# Patient Record
Sex: FEMALE | Race: WHITE | Employment: OTHER | URBAN - METROPOLITAN AREA
[De-identification: names, ages, dates, MRNs, and addresses within clinical notes are randomized per-mention and may not be internally consistent; named-entity substitution may affect disease eponyms.]

---

## 2019-06-07 DIAGNOSIS — M25.561 RIGHT KNEE PAIN, UNSPECIFIED CHRONICITY: Primary | ICD-10-CM

## 2019-06-10 ENCOUNTER — OFFICE VISIT (OUTPATIENT)
Dept: ORTHOPEDIC SURGERY | Age: 64
End: 2019-06-10
Payer: COMMERCIAL

## 2019-06-10 DIAGNOSIS — M25.561 RIGHT KNEE PAIN, UNSPECIFIED CHRONICITY: Primary | ICD-10-CM

## 2019-06-10 PROCEDURE — 20610 DRAIN/INJ JOINT/BURSA W/O US: CPT | Performed by: ORTHOPAEDIC SURGERY

## 2019-06-10 PROCEDURE — 99203 OFFICE O/P NEW LOW 30 MIN: CPT | Performed by: ORTHOPAEDIC SURGERY

## 2019-06-10 RX ORDER — LIDOCAINE HYDROCHLORIDE 10 MG/ML
4 INJECTION, SOLUTION EPIDURAL; INFILTRATION; INTRACAUDAL; PERINEURAL ONCE
Status: COMPLETED | OUTPATIENT
Start: 2019-06-10 | End: 2019-06-10

## 2019-06-10 RX ORDER — TRIAMCINOLONE ACETONIDE 40 MG/ML
40 INJECTION, SUSPENSION INTRA-ARTICULAR; INTRAMUSCULAR ONCE
Status: COMPLETED | OUTPATIENT
Start: 2019-06-10 | End: 2019-06-10

## 2019-06-10 RX ADMIN — LIDOCAINE HYDROCHLORIDE 4 ML: 10 INJECTION, SOLUTION EPIDURAL; INFILTRATION; INTRACAUDAL; PERINEURAL at 14:02

## 2019-06-10 RX ADMIN — TRIAMCINOLONE ACETONIDE 40 MG: 40 INJECTION, SUSPENSION INTRA-ARTICULAR; INTRAMUSCULAR at 14:03

## 2019-06-10 NOTE — LETTER
6/10/2019    No referring provider defined for this encounter. RE: Janice Cortez    Dear Dr. Stephan Luna ref. provider found,    Thank you for allowing me to participate in the care of Ms. Damico. I had the opportunity to evaluate the patient on 6/10/2019. Attached you will find my evaluation and recommendations. Thanks again for the confidence you have expressed in me by allowing my participation in the care of your patient. I will keep you apprised of further developments in the patients treatment course as it progresses. If I can be of further assistance in any fashion, please feel free to contact me at your convenience.     Sincerely,        Yoselin Walter  Shoulder and Elbow Surgery

## 2019-06-15 NOTE — PROGRESS NOTES
Orthopedic Knee Encounter Note     Chief complaint: Right knee pain    HPI: Jeffery Kirk is a 66 y.o. female who presents for chronic right knee pain. 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 She hurt her right knee at the time but did not have any surgery. She left the service and became a  and has since had 3 surgeries on this knee as a result of pain and instability. The first surgery was in  when she had an ACL reconstruction. She then had 2 surgeries on her knee to address medial and lateral meniscal tears. She has since been dealing with chronic pain in the anterior and posterior aspects of the knee. She has a hard time straightening out the knee. She reports popping locking and catching in his knee associated with intermittent swelling and indicates that her knee feels unstable. Previous treatment:    NSAIDs: Ibuprofen    Injections: Cortisone injections. Last injection in 2018    Physical therapy: Yes, 3 months ago. No improvement    Surgeries: See above    Review of Systems:     Constitution: no fever or chills   Pain level: 5/10  Musculoskeletal: As noted in the HPI   Neurologic: no neurologic symptoms    Past Medical History  Mila Grimaldo  has a past medical history of Cervical disc disease and TBI (traumatic brain injury) ([facility] [address] Past Surgical History  Mila Grimaldo  has a past surgical history that includes  section; Hysterectomy; Cosmetic surgery; Gastric bypass surgery; and knee surgery.     Current Medications  Current Outpatient Medications   Medication Sig Dispense Refill    ALPRAZolam (XANAX) 0.25 MG tablet   5    NUEDEXTA 20-10 MG CAPS per capsule   1    donepezil (ARICEPT) 10 MG tablet   0    oxyCODONE-acetaminophen (PERCOCET) 5-325 MG per tablet   0    Almotriptan Malate (AXERT PO) Take by mouth      gabapentin (NEURONTIN) 300 MG capsule Take 300 mg by mouth 3 times daily      loss associated with mild osteophytic change in both compartments. Evidence of previous ACL reconstruction with both femoral and tibial tunnels. Femoral tunnel appears slightly vertical.  Calcification involving the distal insertion of the quadriceps noted. Some calcified masses in the posterior aspect of the knee suggestive of possible loose bodies. Impression: With mild osteoarthritis and evidence of previous ACL reconstruction with possible loose bodies in the posterior aspect of the knee. Impression/Plan:     Minnie Christopher is a 66 y.o. old female with right knee pain likely due to underlying osteoarthritis. There is a question as to possible recurrent ACL tear. She very well may have associated medial and lateral meniscus tears and the aforementioned loose bodies noted on her x-ray. She has had an MRI study but unfortunately does not have the images available for me to review today. We discussed treatment options at this time. She would like to proceed with conservative measures. Consequently she had an injection administered as outlined below. I will see her back for reevaluation in 3 months. In the meantime she is encouraged to work on getting her MRI study for review at that time. Procedure: right intraarticular knee injection  Following an appropriate discussion with the patient regarding the risks and benefits of the procedure she consented to proceed. her right knee was prepped using chlorhexadine solution. Using aseptic technique and through a superolateral approach, her right knee joint was injected with a 5 cc mixture of 1cc 40mg/ml kenalog and 4 cc of 1% lidocaine without epinephrine. A band aid was applied to the injection site. she tolerated the injection with no immediate adverse reactions.         NA = Not assessed  n = No  y = Yes  SLR = Straight leg raise  MCL = Medial collateral ligament  LCL = Lateral collateral ligament

## 2019-09-16 ENCOUNTER — OFFICE VISIT (OUTPATIENT)
Dept: ORTHOPEDIC SURGERY | Age: 64
End: 2019-09-16
Payer: COMMERCIAL

## 2019-09-16 VITALS — WEIGHT: 215 LBS | HEIGHT: 65 IN | BODY MASS INDEX: 35.82 KG/M2

## 2019-09-16 DIAGNOSIS — M25.561 CHRONIC PAIN OF RIGHT KNEE: Primary | ICD-10-CM

## 2019-09-16 DIAGNOSIS — G89.29 CHRONIC PAIN OF RIGHT KNEE: Primary | ICD-10-CM

## 2019-09-16 PROCEDURE — 99213 OFFICE O/P EST LOW 20 MIN: CPT | Performed by: ORTHOPAEDIC SURGERY

## 2019-09-16 PROCEDURE — 20610 DRAIN/INJ JOINT/BURSA W/O US: CPT | Performed by: ORTHOPAEDIC SURGERY

## 2019-10-08 RX ORDER — TRIAMCINOLONE ACETONIDE 40 MG/ML
40 INJECTION, SUSPENSION INTRA-ARTICULAR; INTRAMUSCULAR ONCE
Status: COMPLETED | OUTPATIENT
Start: 2019-10-08 | End: 2019-10-08

## 2019-10-08 RX ORDER — LIDOCAINE HYDROCHLORIDE 10 MG/ML
5 INJECTION, SOLUTION INFILTRATION; PERINEURAL ONCE
Status: COMPLETED | OUTPATIENT
Start: 2019-10-08 | End: 2019-10-08

## 2019-10-08 RX ADMIN — LIDOCAINE HYDROCHLORIDE 5 ML: 10 INJECTION, SOLUTION INFILTRATION; PERINEURAL at 07:15

## 2019-10-08 RX ADMIN — TRIAMCINOLONE ACETONIDE 40 MG: 40 INJECTION, SUSPENSION INTRA-ARTICULAR; INTRAMUSCULAR at 07:15

## 2022-05-07 ENCOUNTER — HOSPITAL ENCOUNTER (EMERGENCY)
Age: 67
Discharge: HOME | End: 2022-05-07
Attending: EMERGENCY MEDICINE
Payer: COMMERCIAL

## 2022-05-07 VITALS
BODY MASS INDEX: 36.65 KG/M2 | HEIGHT: 65 IN | OXYGEN SATURATION: 98 % | TEMPERATURE: 98.3 F | DIASTOLIC BLOOD PRESSURE: 81 MMHG | RESPIRATION RATE: 16 BRPM | HEART RATE: 87 BPM | WEIGHT: 220 LBS | SYSTOLIC BLOOD PRESSURE: 113 MMHG

## 2022-05-07 DIAGNOSIS — M54.16 LUMBAR BACK PAIN WITH RADICULOPATHY AFFECTING LEFT LOWER EXTREMITY: Primary | ICD-10-CM

## 2022-05-07 PROCEDURE — 6370000000 HC RX 637 (ALT 250 FOR IP): Performed by: PHYSICIAN ASSISTANT

## 2022-05-07 PROCEDURE — 99284 EMERGENCY DEPT VISIT MOD MDM: CPT

## 2022-05-07 PROCEDURE — 6360000002 HC RX W HCPCS: Performed by: PHYSICIAN ASSISTANT

## 2022-05-07 PROCEDURE — 96372 THER/PROPH/DIAG INJ SC/IM: CPT

## 2022-05-07 RX ORDER — LIDOCAINE 50 MG/G
1 PATCH TOPICAL DAILY
Qty: 10 PATCH | Refills: 0 | Status: SHIPPED | OUTPATIENT
Start: 2022-05-07 | End: 2022-05-17

## 2022-05-07 RX ORDER — DEXAMETHASONE SODIUM PHOSPHATE 10 MG/ML
10 INJECTION INTRAMUSCULAR; INTRAVENOUS ONCE
Status: COMPLETED | OUTPATIENT
Start: 2022-05-07 | End: 2022-05-07

## 2022-05-07 RX ORDER — KETOROLAC TROMETHAMINE 30 MG/ML
30 INJECTION, SOLUTION INTRAMUSCULAR; INTRAVENOUS ONCE
Status: COMPLETED | OUTPATIENT
Start: 2022-05-07 | End: 2022-05-07

## 2022-05-07 RX ORDER — PREDNISONE 20 MG/1
TABLET ORAL
Qty: 8 TABLET | Refills: 0 | Status: SHIPPED | OUTPATIENT
Start: 2022-05-07 | End: 2022-05-12

## 2022-05-07 RX ORDER — LIDOCAINE 4 G/G
1 PATCH TOPICAL ONCE
Status: DISCONTINUED | OUTPATIENT
Start: 2022-05-07 | End: 2022-05-07 | Stop reason: HOSPADM

## 2022-05-07 RX ADMIN — KETOROLAC TROMETHAMINE 30 MG: 30 INJECTION, SOLUTION INTRAMUSCULAR at 15:51

## 2022-05-07 RX ADMIN — DEXAMETHASONE SODIUM PHOSPHATE 10 MG: 10 INJECTION INTRAMUSCULAR; INTRAVENOUS at 15:52

## 2022-05-07 ASSESSMENT — PAIN - FUNCTIONAL ASSESSMENT: PAIN_FUNCTIONAL_ASSESSMENT: 0-10

## 2022-05-07 ASSESSMENT — PAIN SCALES - GENERAL: PAINLEVEL_OUTOF10: 8

## 2022-05-07 NOTE — ED PROVIDER NOTES
[id number] [facility] ED  [address] [facility] [address]  Phone: [phone]  Fax: [phone]      Attending Physician Attestation    I performed a history and physical examination of the patient and discussed management with the mid level provider. I reviewed the mid level provider's note and agree with the documented findings and plan of care. Any areas of disagreement are noted on the chart. I was personally present for the key portions of any procedures. I have documented in the chart those procedures where I was not present during the key portions. I have reviewed the emergency nurses triage note. I agree with the chief complaint, past medical history, past surgical history, allergies, medications, social and family history as documented unless otherwise noted below. Documentation of the HPI, Physical Exam and Medical Decision Making performed by mid level providers is based on my personal performance of the HPI, PE and MDM. For Physician Assistant/ Nurse Practitioner cases/documentation I have personally evaluated this patient and have completed at least one if not all key elements of the E/M (history, physical exam, and MDM). Additional findings are as noted. CHIEF COMPLAINT       Chief Complaint   Patient presents with    Back Pain     SI surgery last week with no complications / radiates to L leg         HISTORY OF PRESENT ILLNESS    Zakiya Reyes is a 61 y.o. female who presents left low back pain with radiation down the left leg. She has a history of chronic back problems and has had problems with sciatica in the past.  He denies any muscle weakness to the right leg. She denies having any abdominal pain. Denies any problems of bowel or bladder control. Denies any fever or chills. PAST MEDICAL HISTORY    has a past medical history of Cervical disc disease and TBI (traumatic brain injury) ([facility] [address]     SURGICAL HISTORY      has a past surgical history that includes  section; Hysterectomy; Cosmetic surgery; Gastric bypass surgery; and knee surgery. CURRENT MEDICATIONS       Previous Medications    ALMOTRIPTAN MALATE (AXERT PO)    Take by mouth    ALPRAZOLAM (XANAX) 0.25 MG TABLET        DONEPEZIL (ARICEPT) 10 MG TABLET        GABAPENTIN (NEURONTIN) 300 MG CAPSULE    Take 300 mg by mouth 3 times daily    NUEDEXTA 20-10 MG CAPS PER CAPSULE        OXYCODONE-ACETAMINOPHEN (PERCOCET) 5-325 MG PER TABLET        VENLAFAXINE (EFFEXOR) 100 MG TABLET    Take 100 mg by mouth 3 times daily       ALLERGIES     is allergic to penicillins and nubain [nalbuphine hcl]. FAMILY HISTORY     has no family status information on file. family history is not on file. SOCIAL HISTORY      reports that she has never smoked. She has never used smokeless tobacco. She reports that she does not drink alcohol. PHYSICAL EXAM     INITIAL VITALS:  height is 5' 5\" (1.651 m) and weight is 99.8 kg (220 lb). Her temperature is 98.3 °F (36.8 °C). Her blood pressure is 113/81 and her pulse is 87. Her respiration is 16 and oxygen saturation is 98%. Patient has positive straight leg raising left leg at about 60 degrees. Negative straight leg raising on the right. Motor strength is 5/5 bilateral lower extremities. Has strong dorsalis pedal pulses bilateral.  Abdomen is soft and nontender with positive bowel sounds. Patient is nontender on palpation of the spinous process of the lumbar spine. She has tenderness in the paravertebral muscles and through the buttocks on the left. Patient has normal sensation to light touch bilateral lower extremities.       DIAGNOSTIC RESULTS     EKG: All EKG's are interpreted by the Emergency Department Physician who either signs or Co-signs this chart in the absence of a cardiologist.        RADIOLOGY:   Non-plain film images such as CT, Ultrasound and MRI are read by the radiologist. Plain radiographic images are visualized and the radiologist interpretations are reviewed as follows:         LABS:  No results found for this visit on 05/07/22. EMERGENCY DEPARTMENT COURSE:   Vitals:    Vitals:    05/07/22 1457   BP: 113/81   Pulse: 87   Resp: 16   Temp: 98.3 °F (36.8 °C)   SpO2: 98%   Weight: 99.8 kg (220 lb)   Height: 5' 5\" (1.651 m)     -------------------------  BP: 113/81, Temp: 98.3 °F (36.8 °C), Pulse: 87, Resp: 16      PERTINENT ATTENDING PHYSICIAN COMMENTS:    She has no evidence of cauda equina syndrome. She was given Toradol, lidocaine patch, and Decadron. Patient can follow-up on an outpatient basis with her primary care provider. Patient has low back strain with sciatica left side. (Please note that portions of this note were completed with a voice recognition program.  Efforts were made to edit the dictations but occasionally words are mis-transcribed. )    Sunday Cordoba DO, , MD, F.A.C.E.P.   Attending Emergency Medicine Physician        Torsten Arthur,   05/07/22 9528

## 2022-05-07 NOTE — ED PROVIDER NOTES
[address] [facility] ED  [address] [facility] [address]  Phone: [phone]  Fax: [phone]        Pt Name: Jhony oSto  MRN: [id number]  [place] 1955  Date of evaluation: 22    [address] [place] [address]       Chief Complaint   Patient presents with    Back Pain     SI surgery last week with no complications / radiates to L leg       HISTORY OF PRESENT ILLNESS (Location/Symptom, Timing/Onset, Context/Setting, Quality, Duration, Modifying Factors, Severity)      Jhony Soto is a 61 y.o. female with pertinent PMH who presents to the ED via private auto with back pain. Patient states that 1 week ago she had a radiofrequency ablation done to \"burn the nerves. \"  She reports that since then she has been experiencing pain to her left low back area and over the past couple of days she has been experiencing radiation into the left leg. It is significantly worse with ambulation. She has been taking Percocet with minimal improvement in the pain. Denies noticing any alleviating factors and takes a while for her to get comfortable. Reports of difficulty sleeping due to the severity of the pain. She does mention that she has been laying on a heating pad. Denies any saddle anesthesia, incontinence of bowel/bladder, IV drug use, nausea, vomiting, fever, chills, weakness, paresthesias, headache, vision changes, dizziness, lightheadedness, syncope, abdominal pain, chest pain, shortness of breath, or any other concerns at this time. PAST MEDICAL / SURGICAL / SOCIAL / FAMILY HISTORY     PMH:  has a past medical history of Cervical disc disease and TBI (traumatic brain injury) ([facility] [address] Surgical History:  has a past surgical history that includes  section; Hysterectomy; Cosmetic surgery; Gastric bypass surgery; and knee surgery. Social History:  reports that she has never smoked. She has never used smokeless tobacco. She reports that she does not drink alcohol.   Family History: has no family status information on file. family history is not on file. Psychiatric History: None    Allergies: Penicillins and Nubain [nalbuphine hcl]    Home Medications:   Prior to Admission medications    Medication Sig Start Date End Date Taking? Authorizing Provider   ALPRAZolam Tomeka Curry) 0.25 MG tablet  10/22/15   Historical Provider, MD   NUEDEXTA 20-10 MG CAPS per capsule  10/5/15   Historical Provider, MD   donepezil (ARICEPT) 10 MG tablet  10/21/15   Historical Provider, MD   oxyCODONE-acetaminophen (PERCOCET) 5-325 MG per tablet  10/27/15   Historical Provider, MD   Almotriptan Malate (AXERT PO) Take by mouth    Historical Provider, MD   gabapentin (NEURONTIN) 300 MG capsule Take 300 mg by mouth 3 times daily    Historical Provider, MD   venlafaxine (EFFEXOR) 100 MG tablet Take 100 mg by mouth 3 times daily    Historical Provider, MD       REVIEW OF SYSTEMS  (2-9 systems for level 4, 10 ormore for level 5)      Review of Systems    Constitutional: See HPI. Eyes: See HPI. HENT: Denies sore throat or neck pain. Respiratory: See HPI. Cardiovascular: See HPI. GI: See HPI. : See HPI. Musculoskeletal: See HPI. Skin: Denies new rashes or wounds. Neurologic:  See HPI. Psychiatric: Denies sleep disturbances. Endocrine:  Denies unexpected weight loss. All other systems negative except as marked. PHYSICAL EXAM  (up to 7 for level 4, 8 or more for level 5)      INITIAL VITALS:  height is 5' 5\" (1.651 m) and weight is 99.8 kg (220 lb). Her temperature is 98.3 °F (36.8 °C). Her blood pressure is 113/81 and her pulse is 87. Her respiration is 16 and oxygen saturation is 98%. Vital signs reviewed. Physical Exam    General:  Alert, cooperative, well-groomed, well-nourished, appears stated age, and is in no acute distress. Head:  Normocephalic, atraumatic, and without obvious abnormality. Eyes:  Sclerae/conjunctivae clear without injection, pallor, or icterus.  Corneas clear without opacities. EOM's intact. ENT: Ears and nose are all without obvious masses lesion or deformity. No oropharynx examination performed due to aerosolization risk during COVID-19 pandemic. Neck: Supple and symmetrical. Trachea midline. No adenopathy. Lungs:   No respiratory distress. CTA bilaterally. No wheezes, rhonchi, or rales. Heart:  Regular rate. Regular rhythm. No murmurs, rubs, or gallops. Abdomen:   Normoactive bowel sounds. Soft, nontender, nondistended without guarding or rebound. No palpable masses. No CVA tenderness. Musculoskeletal: The lumbar region is normal in appearance with TTP over left SI joint. No midline tenderness of the spine or step-off. Good ROM of the spine. No deformities, ecchymosis, edema, erythema, warmth, abrasions, or lacerations to the area. No other signs of trauma. No tenderness to palpation of the thoracic region, cervical region, or BLE. Strength of the BLE are 5/5. Sensation intact to light touch bilaterally. No foot drop. Positive straight leg raise. DP pulses 2+. DTRs are 2+ and symmetrical.  Cap refill < 2 seconds. Extremities: Warm and dry without erythema or edema. No venous stasis changes. Skin: Soft, good turgor, and well-hydrated. No obvious rashes or lesions. Neurologic: GCS is 15 and no focal deficits are appreciated. Normal gait. Grossly normal motor and sensation. Speech clear. Psychiatric: Normal mood and affect. Normal behavior. Coherent thought process. DIFFERENTIAL DIAGNOSIS / MDM     The patient presents with left SI joint pain with radiculopathy to the left leg. . Vital signs are unremarkable. No skin lesions were seen. Pulses are 2/4 and motor is 5/5, bilaterally. The patient is neurovascularly intact and sensation intact. No signs of cauda equina. Positive straight leg raise.   Symptoms likely consistent with sciatica, and will treat medically and advised follow-up with her specialist.    The patient and/or family and I and/or the attending have discussed the diagnosis and risks, and we agree with discharging home to follow-up with their primary doctor. The patient appears stable for discharge and has been instructed to return immediately for new concerning symptoms, if the symptoms worsen in any way, or in 8-12 hours if not improved for re-evaluation. We have discussed the symptoms which are most concerning (e.g., fever, changing or worsening pain, persistent vomiting, bloody stools, chest pain, shortness of breath, numbness or weakness to the arms or legs, coolness or color change to the arms or legs) that necessitate immediate return. The patient understands that at this time there is no evidence for a more malignant underlying process, but the patient also understands that early in the process of an illness or injury, an emergency department workup can be falsely reassuring. Routine discharge counseling was given, and the patient understands that worsening, changing or persistent symptoms should prompt an immediate call or follow up with their primary physician or return to the emergency department. The importance of appropriate follow up was also discussed. I have reviewed the disposition diagnosis with the patient and or their family/guardian. I have answered their questions and given discharge instructions. They voiced understanding of these instructions and did not have any further questions or complaints. This patient was seen by the attending physician and they agreed with the assessment and plan. PLAN (LABS / IMAGING / EKG):  No orders of the defined types were placed in this encounter. MEDICATIONS ORDERED:  Orders Placed This Encounter   Medications    ketorolac (TORADOL) injection 30 mg    DISCONTD: lidocaine 4 % external patch 1 patch    dexamethasone (DECADRON) injection 10 mg    lidocaine (LIDODERM) 5 %     Sig: Place 1 patch onto the skin daily for 10 days 12 hours on, 12 hours off.      Dispense:  10 patch     Refill:  0    predniSONE (DELTASONE) 20 MG tablet     Sig: Take 2 tablets by mouth daily for 3 days, THEN 1 tablet daily for 2 days. Dispense:  8 tablet     Refill:  0       Controlled Substances Monitoring:     DIAGNOSTIC RESULTS     RADIOLOGY: All images are read by the radiologist and their interpretations are reviewed. No results found. LABS:  No results found for this visit on 05/07/22. EMERGENCY DEPARTMENT COURSE           Vitals:    Vitals:    05/07/22 1457   BP: 113/81   Pulse: 87   Resp: 16   Temp: 98.3 °F (36.8 °C)   SpO2: 98%   Weight: 99.8 kg (220 lb)   Height: 5' 5\" (1.651 m)     -------------------------  BP: 113/81, Temp: 98.3 °F (36.8 °C), Pulse: 87, Resp: 16      RE-EVALUATION:  No re-evaluation was necessary as patient was discharged home after first impression. CONSULTS:  None    PROCEDURES:  None    FINAL IMPRESSION      1. Lumbar back pain with radiculopathy affecting left lower extremity          DISPOSITION / PLAN     CONDITION ON DISPOSITION:   Good / Stable for discharge. PATIENT REFERRED TO:  Your specialist    Call in 2 days        DISCHARGE MEDICATIONS:  Discharge Medication List as of 5/7/2022  3:42 PM      START taking these medications    Details   lidocaine (LIDODERM) 5 % Place 1 patch onto the skin daily for 10 days 12 hours on, 12 hours off., Disp-10 patch, R-0Normal      predniSONE (DELTASONE) 20 MG tablet Take 2 tablets by mouth daily for 3 days, THEN 1 tablet daily for 2 days. , Disp-8 tablet, R-0Normal             Harjeet Morales PA-C   Emergency Medicine Physician Assistant    (Please note that portions of this note were completed with a voice recognition program.  Efforts were made to edit the dictations but occasionally words aremis-transcribed.)        Harjeet Morales PA-C  05/18/22 0006

## 2022-05-07 NOTE — ED NOTES
Patient arrived to ER with complaints of sacral pain from a procedure performed approximately 1 week prior with no complications during surgery. States the procedure was to burn the nerves of SI and now the pain is in lower back and radiating to the left leg making it numb at times. Denies any falls or any additional complaints of pain. States there is nothing that makes the pain better or worse and she has self administered the percocet that she was prescribed with no relief in pain.       Melissa Valencia RN  05/07/22 7770